# Patient Record
Sex: MALE | Race: OTHER | Employment: FULL TIME | URBAN - METROPOLITAN AREA
[De-identification: names, ages, dates, MRNs, and addresses within clinical notes are randomized per-mention and may not be internally consistent; named-entity substitution may affect disease eponyms.]

---

## 2024-05-16 ENCOUNTER — OFFICE VISIT (OUTPATIENT)
Facility: CLINIC | Age: 59
End: 2024-05-16

## 2024-05-16 VITALS
OXYGEN SATURATION: 98 % | SYSTOLIC BLOOD PRESSURE: 120 MMHG | HEART RATE: 60 BPM | HEIGHT: 68 IN | DIASTOLIC BLOOD PRESSURE: 80 MMHG | WEIGHT: 208.5 LBS | BODY MASS INDEX: 31.6 KG/M2

## 2024-05-16 DIAGNOSIS — Z12.12 ENCOUNTER FOR COLORECTAL CANCER SCREENING: ICD-10-CM

## 2024-05-16 DIAGNOSIS — Z23 NEED FOR VACCINATION: ICD-10-CM

## 2024-05-16 DIAGNOSIS — E78.2 MIXED HYPERLIPIDEMIA: ICD-10-CM

## 2024-05-16 DIAGNOSIS — Z12.11 ENCOUNTER FOR COLORECTAL CANCER SCREENING: ICD-10-CM

## 2024-05-16 DIAGNOSIS — Z00.01 ENCOUNTER FOR GENERAL ADULT MEDICAL EXAMINATION WITH ABNORMAL FINDINGS: Primary | ICD-10-CM

## 2024-05-16 DIAGNOSIS — E11.9 TYPE 2 DIABETES MELLITUS WITHOUT COMPLICATION, WITHOUT LONG-TERM CURRENT USE OF INSULIN (HCC): ICD-10-CM

## 2024-05-16 DIAGNOSIS — I15.2 HYPERTENSION ASSOCIATED WITH DIABETES (HCC): ICD-10-CM

## 2024-05-16 DIAGNOSIS — E11.59 HYPERTENSION ASSOCIATED WITH DIABETES (HCC): ICD-10-CM

## 2024-05-16 DIAGNOSIS — I25.10 CORONARY ARTERIOSCLEROSIS: ICD-10-CM

## 2024-05-16 PROBLEM — E78.5 HYPERLIPIDEMIA: Status: ACTIVE | Noted: 2024-02-18

## 2024-05-16 PROCEDURE — 90472 IMMUNIZATION ADMIN EACH ADD: CPT | Performed by: FAMILY MEDICINE

## 2024-05-16 PROCEDURE — 99386 PREV VISIT NEW AGE 40-64: CPT | Performed by: FAMILY MEDICINE

## 2024-05-16 PROCEDURE — 90715 TDAP VACCINE 7 YRS/> IM: CPT | Performed by: FAMILY MEDICINE

## 2024-05-16 PROCEDURE — 90677 PCV20 VACCINE IM: CPT | Performed by: FAMILY MEDICINE

## 2024-05-16 PROCEDURE — 90471 IMMUNIZATION ADMIN: CPT | Performed by: FAMILY MEDICINE

## 2024-05-16 RX ORDER — LISINOPRIL 5 MG/1
1 TABLET ORAL DAILY
COMMUNITY
End: 2024-05-16

## 2024-05-16 RX ORDER — ATORVASTATIN CALCIUM 80 MG/1
1 TABLET, FILM COATED ORAL DAILY
COMMUNITY
End: 2024-05-16

## 2024-05-16 RX ORDER — LISINOPRIL 5 MG/1
5 TABLET ORAL DAILY
Qty: 90 TABLET | Refills: 3 | Status: SHIPPED | OUTPATIENT
Start: 2024-05-16

## 2024-05-16 RX ORDER — SITAGLIPTIN 100 MG/1
1 TABLET, FILM COATED ORAL DAILY
COMMUNITY
End: 2024-05-16

## 2024-05-16 RX ORDER — ATORVASTATIN CALCIUM 80 MG/1
80 TABLET, FILM COATED ORAL DAILY
Qty: 90 TABLET | Refills: 3 | Status: SHIPPED | OUTPATIENT
Start: 2024-05-16

## 2024-05-16 RX ORDER — SITAGLIPTIN 100 MG/1
100 TABLET, FILM COATED ORAL DAILY
Qty: 90 TABLET | Refills: 3 | Status: SHIPPED | OUTPATIENT
Start: 2024-05-16

## 2024-05-16 NOTE — PROGRESS NOTES
Subjective:   Cesar STILL is a 58 year old male who presents for Establish Care (Patient comes to the office to establish care. ) and Physical (Patient is requesting annul physical exam. )     HTN  Doing well on lisinopril 5 mg daily    DM  Doing well on januvia 100 mg. Patient could not tolerate metformin due to skin reaction. Noted itching. Patient has had eye exam 8 months ago. Was told everything normal. No issues with floaters or cataracts.     HLD  Doing well on atorvastatin    CAD  Noted per chart review. Questionable NSTEMI in 2020 at [facility] Had heart cath and was told had several blockages. Has seen cardiology for this. Will follow up in Sept 2024. Had Nuclera stress test and echo. Doing well on ASA.     Health maintenance  Colonoscopy done in 2021. Due for repeat in 2026. Done at [facility]    History/Other:    Chief Complaint Reviewed and Verified  Nursing Notes Reviewed and   Verified  Tobacco Reviewed  Allergies Reviewed  Medications Reviewed    Problem List Reviewed  Medical History Reviewed  Surgical History   Reviewed  Family History Reviewed  Social History Reviewed         Tobacco:  He smoked tobacco in the past but quit greater than 12 months ago.  Social History     Tobacco Use   Smoking Status Former    Current packs/day: 0.50    Average packs/day: 0.5 packs/day for 20.4 years (10.2 ttl pk-yrs)    Types: Cigarettes    Start date: 2004   Smokeless Tobacco Never        Current Outpatient Medications   Medication Sig Dispense Refill    lisinopril 5 MG Oral Tab Take 1 tablet (5 mg total) by mouth daily. 90 tablet 3    Aspirin 81 MG Oral Cap Take 1 capsule by mouth daily. 90 capsule 3    Metoprolol Succinate 50 MG Oral Capsule ER 24 Hour Sprinkle Take 1 capsule by mouth daily. 90 capsule 3    JANUVIA 100 MG Oral Tab Take 1 tablet (100 mg total) by mouth daily. 90 tablet 3    atorvastatin 80 MG Oral Tab Take 1 tablet (80 mg total) by mouth daily. 90 tablet 3         Review of  Systems:  Review of Systems   Constitutional: Negative.    HENT: Negative.     Eyes: Negative.    Respiratory: Negative.     Cardiovascular: Negative.    Gastrointestinal: Negative.    Genitourinary: Negative.    Musculoskeletal: Negative.    Skin: Negative.    Neurological: Negative.    Psychiatric/Behavioral: Negative.           Objective:   /80 (BP Location: Left arm, Patient Position: Sitting, Cuff Size: adult)   Pulse 60   Ht 5' 8\" (1.727 m)   Wt 208 lb 8 oz (94.6 kg)   SpO2 98%   BMI 31.70 kg/m²  Estimated body mass index is 31.7 kg/m² as calculated from the following:    Height as of this encounter: 5' 8\" (1.727 m).    Weight as of this encounter: 208 lb 8 oz (94.6 kg).  Physical Exam  Vitals and nursing note reviewed.   Constitutional:       General: He is not in acute distress.     Appearance: Normal appearance. He is not ill-appearing.   HENT:      Head: Normocephalic and atraumatic.      Right Ear: Tympanic membrane normal. There is no impacted cerumen.      Left Ear: Tympanic membrane normal. There is no impacted cerumen.      Mouth/Throat:      Mouth: Mucous membranes are moist.      Pharynx: Oropharynx is clear. No oropharyngeal exudate or posterior oropharyngeal erythema.   Eyes:      General:         Right eye: No discharge.         Left eye: No discharge.      Extraocular Movements: Extraocular movements intact.      Pupils: Pupils are equal, round, and reactive to light.   Cardiovascular:      Rate and Rhythm: Normal rate and regular rhythm.      Heart sounds: Normal heart sounds. No murmur heard.     No friction rub. No gallop.   Pulmonary:      Effort: Pulmonary effort is normal.      Breath sounds: Normal breath sounds. No wheezing, rhonchi or rales.   Abdominal:      General: Abdomen is flat. Bowel sounds are normal. There is no distension.      Palpations: Abdomen is soft. There is no mass.      Tenderness: There is no abdominal tenderness. There is no guarding or rebound.    Musculoskeletal:         General: Normal range of motion.      Cervical back: Normal range of motion.      Right lower leg: No edema.      Left lower leg: No edema.   Skin:     General: Skin is warm and dry.      Findings: No rash.   Neurological:      General: No focal deficit present.      Mental Status: He is alert. Mental status is at baseline.   Psychiatric:         Mood and Affect: Mood normal.         Behavior: Behavior normal.       Bilateral barefoot skin diabetic exam is normal, visualized feet and the appearance is normal.  Bilateral monofilament/sensation of both feet is normal.  Pulsation pedal pulse exam of both lower legs/feet is normal as well.     Assessment & Plan:   1. Encounter for general adult medical examination with abnormal findings (Primary)  -     Lipid Panel; Future; Expected date: 05/16/2024  -     TSH W Reflex To Free T4; Future; Expected date: 05/16/2024  -     PSA Total, Screen; Future; Expected date: 05/16/2024  -     Comp Metabolic Panel (14); Future; Expected date: 05/16/2024    Ordered annual labs    2. Encounter for colorectal cancer screening  Has already had. Had patient sign IVANNA    3. Type 2 diabetes mellitus without complication, without long-term current use of insulin (HCC)  -     Hemoglobin A1C; Future; Expected date: 05/16/2024  -     Diabetic Retinopathy Exam; Future; Expected date: 05/16/2024  -     PCV20 (Prevnar 20)  -     Comp Metabolic Panel (14); Future; Expected date: 05/16/2024  -     Januvia; Take 1 tablet (100 mg total) by mouth daily.  Dispense: 90 tablet; Refill: 3    -as per last A1c well controlled. Ordered updated A1c and diabetes labs. No current change to medications  -ordered DM eye exam at other  office. Explained to patient how to get done.     4. Hypertension associated with diabetes (HCC)  -     Microalb/Creat Ratio, Random Urine; Future; Expected date: 05/16/2024  -     Lisinopril; Take 1 tablet (5 mg total) by mouth daily.  Dispense: 90  tablet; Refill: 3  -     Metoprolol Succinate; Take 1 capsule by mouth daily.  Dispense: 90 capsule; Refill: 3    Well controlled. Less than goal of less than 140/90. No dose changes to medications. Refilled    5. Coronary arteriosclerosis  -     Aspirin; Take 1 capsule by mouth daily.  Dispense: 90 capsule; Refill: 3    -following with cardiology. On ASA and statin. Refilled    6. Mixed hyperlipidemia  -     Metoprolol Succinate; Take 1 capsule by mouth daily.  Dispense: 90 capsule; Refill: 3  -     Atorvastatin Calcium; Take 1 tablet (80 mg total) by mouth daily.  Dispense: 90 tablet; Refill: 3    -ordered updated lipid levels. On statin    7. Need for vaccination  -     TETANUS, DIPHTHERIA TOXOIDS AND ACELLULAR PERTUSIS VACCINE (TDAP), >7 YEARS, IM USE    -adminstered by staff      Return in about 3 months (around 8/16/2024) for DM and HTN.    Lalitha Osuna MD, 5/16/2024, 4:06 PM

## 2024-05-17 ENCOUNTER — TELEPHONE (OUTPATIENT)
Facility: CLINIC | Age: 59
End: 2024-05-17

## 2024-05-17 DIAGNOSIS — I15.2 HYPERTENSION ASSOCIATED WITH DIABETES (HCC): Primary | ICD-10-CM

## 2024-05-17 DIAGNOSIS — E11.59 HYPERTENSION ASSOCIATED WITH DIABETES (HCC): Primary | ICD-10-CM

## 2024-05-17 RX ORDER — METOPROLOL SUCCINATE 50 MG/1
50 TABLET, EXTENDED RELEASE ORAL DAILY
Qty: 90 TABLET | Refills: 3 | Status: SHIPPED | OUTPATIENT
Start: 2024-05-17

## 2024-05-17 NOTE — TELEPHONE ENCOUNTER
Disp Refills Start End    metoprolol succinate ER 50 MG Oral Tablet 24 Hr 90 tablet 3 5/17/2024 --    Sig - Route: Take 1 tablet (50 mg total) by mouth daily. - Oral    Sent to pharmacy as: Metoprolol Succinate ER 50 MG Oral Tablet Extended Release 24 Hour (Toprol XL)    E-Prescribing Status: Receipt confirmed by pharmacy (5/17/2024 10:41 AM CDT)      Associated Diagnoses    Hypertension associated with diabetes (HCC)  - Primary        Pharmacy    [id number] - [place], [place] - [facility] [address] AT [facility], [phone], [phone]

## 2024-05-25 ENCOUNTER — LAB ENCOUNTER (OUTPATIENT)
Dept: LAB | Facility: HOSPITAL | Age: 59
End: 2024-05-25
Attending: FAMILY MEDICINE

## 2024-05-25 DIAGNOSIS — E11.9 TYPE 2 DIABETES MELLITUS WITHOUT COMPLICATION, WITHOUT LONG-TERM CURRENT USE OF INSULIN (HCC): ICD-10-CM

## 2024-05-25 DIAGNOSIS — I15.2 HYPERTENSION ASSOCIATED WITH DIABETES (HCC): ICD-10-CM

## 2024-05-25 DIAGNOSIS — E11.59 HYPERTENSION ASSOCIATED WITH DIABETES (HCC): ICD-10-CM

## 2024-05-25 DIAGNOSIS — Z00.01 ENCOUNTER FOR GENERAL ADULT MEDICAL EXAMINATION WITH ABNORMAL FINDINGS: ICD-10-CM

## 2024-05-25 LAB
ALBUMIN SERPL-MCNC: 4.2 G/DL (ref 3.2–4.8)
ALBUMIN/GLOB SERPL: 1.1 {RATIO} (ref 1–2)
ALP LIVER SERPL-CCNC: 104 U/L
ALT SERPL-CCNC: 34 U/L
ANION GAP SERPL CALC-SCNC: 7 MMOL/L (ref 0–18)
AST SERPL-CCNC: 27 U/L (ref ?–34)
BILIRUB SERPL-MCNC: 0.3 MG/DL (ref 0.3–1.2)
BUN BLD-MCNC: 14 MG/DL (ref 9–23)
BUN/CREAT SERPL: 16.3 (ref 10–20)
CALCIUM BLD-MCNC: 9 MG/DL (ref 8.7–10.4)
CHLORIDE SERPL-SCNC: 108 MMOL/L (ref 98–112)
CHOLEST SERPL-MCNC: 115 MG/DL (ref ?–200)
CO2 SERPL-SCNC: 27 MMOL/L (ref 21–32)
COMPLEXED PSA SERPL-MCNC: 0.33 NG/ML (ref ?–4)
CREAT BLD-MCNC: 0.86 MG/DL
CREAT UR-SCNC: 111 MG/DL
EGFRCR SERPLBLD CKD-EPI 2021: 101 ML/MIN/1.73M2 (ref 60–?)
EST. AVERAGE GLUCOSE BLD GHB EST-MCNC: 140 MG/DL (ref 68–126)
FASTING PATIENT LIPID ANSWER: YES
FASTING STATUS PATIENT QL REPORTED: YES
GLOBULIN PLAS-MCNC: 3.7 G/DL (ref 2–3.5)
GLUCOSE BLD-MCNC: 111 MG/DL (ref 70–99)
HBA1C MFR BLD: 6.5 % (ref ?–5.7)
HDLC SERPL-MCNC: 32 MG/DL (ref 40–59)
LDLC SERPL CALC-MCNC: 64 MG/DL (ref ?–100)
MICROALBUMIN UR-MCNC: 0.5 MG/DL
MICROALBUMIN/CREAT 24H UR-RTO: 4.5 UG/MG (ref ?–30)
NONHDLC SERPL-MCNC: 83 MG/DL (ref ?–130)
OSMOLALITY SERPL CALC.SUM OF ELEC: 295 MOSM/KG (ref 275–295)
POTASSIUM SERPL-SCNC: 4.4 MMOL/L (ref 3.5–5.1)
PROT SERPL-MCNC: 7.9 G/DL (ref 5.7–8.2)
SODIUM SERPL-SCNC: 142 MMOL/L (ref 136–145)
TRIGL SERPL-MCNC: 103 MG/DL (ref 30–149)
TSI SER-ACNC: 2.3 MIU/ML (ref 0.55–4.78)
VLDLC SERPL CALC-MCNC: 15 MG/DL (ref 0–30)

## 2024-05-25 PROCEDURE — 80053 COMPREHEN METABOLIC PANEL: CPT

## 2024-05-25 PROCEDURE — 83036 HEMOGLOBIN GLYCOSYLATED A1C: CPT

## 2024-05-25 PROCEDURE — 82043 UR ALBUMIN QUANTITATIVE: CPT

## 2024-05-25 PROCEDURE — 80061 LIPID PANEL: CPT

## 2024-05-25 PROCEDURE — 82570 ASSAY OF URINE CREATININE: CPT

## 2024-05-25 PROCEDURE — 36415 COLL VENOUS BLD VENIPUNCTURE: CPT

## 2024-05-25 PROCEDURE — 84443 ASSAY THYROID STIM HORMONE: CPT

## 2024-05-28 NOTE — PROGRESS NOTES
Attempted to call patient. Family member states patient is not home and gets home at 6:30 pm. Informed family member I will call back on Thursday our late night to give patient normal test results.

## 2024-05-28 NOTE — PROGRESS NOTES
Attempted to reach patient about lab results. Family member states patient is not home and he gets home at 6:30. Informed family member I will call back on Thursday our late night to give normal test results.

## 2024-06-13 ENCOUNTER — TELEPHONE (OUTPATIENT)
Facility: CLINIC | Age: 59
End: 2024-06-13

## 2024-06-13 DIAGNOSIS — I25.10 CORONARY ARTERIOSCLEROSIS: Primary | ICD-10-CM

## 2024-06-13 RX ORDER — ASPIRIN 81 MG/1
81 TABLET ORAL DAILY
Qty: 90 TABLET | Refills: 3 | Status: SHIPPED | OUTPATIENT
Start: 2024-06-13

## 2024-06-13 NOTE — TELEPHONE ENCOUNTER
Noted signed off on aspirin tablets. Thank you for pending    Lalitha Osuna MD, 06/13/24, 9:48 AM

## 2024-06-13 NOTE — TELEPHONE ENCOUNTER
With assist of NICK  Patients wife called back, stated patient stated he do not want to take Aspirin capsule prefers tablets , did  medication       Nurse please call back after Dr Baumann

## 2024-06-13 NOTE — TELEPHONE ENCOUNTER
Response to cancel request received from pharmacy.  Received: Today  [facility]  [facility] Refills  The pharmacy received the electronic cancel request, but could not cancel the prescription. Additional follow up tasks may be necessary based on the pharmacy response noted below.    Pharmacy Note: Prescription not found. Contact Pharmacy by other means          Pharmacy    [facility]/pharmacy #[id number] - [place], [place] - [address] AT [facility] [address], [phone], [phone] [address] [facility] [address]   Phone: [phone] Fax: [phone]   Hours: Not open 24 hours     Outpatient Medication Detail     Disp Refills Start End    Aspirin 81 MG Oral Cap (Discontinued) 90 capsule 3 5/16/2024 6/13/2024    Sig - Route: Take 1 capsule by mouth daily. - Oral    Sent to pharmacy as: Aspirin 81 MG Oral Capsule    Reason for Discontinue:  Stop This Medication    E-Prescribing Status: Receipt confirmed by pharmacy (5/16/2024  4:40 PM CDT)    E-Cancel Status: Request denied by pharmacy (6/13/2024  9:47 AM CDT)       E-Cancel Status Note: Prescription not found. Contact Pharmacy by other means      Order Providers    Prescribing Provider Encounter Provider   Lalitha Osuna MD Pena, Sandra, MD       Associated Diagnoses    Coronary arteriosclerosis        Encounter    View Encounter              This Order Has Been Discontinued    Order Status Reason By On   Discontinued  Stop This Medication Lalitha Osuna MD 6/13/24 0967

## 2024-06-14 ENCOUNTER — TELEPHONE (OUTPATIENT)
Facility: CLINIC | Age: 59
End: 2024-06-14

## 2024-06-14 DIAGNOSIS — Z12.11 ENCOUNTER FOR COLORECTAL CANCER SCREENING: Primary | ICD-10-CM

## 2024-06-14 DIAGNOSIS — Z12.12 ENCOUNTER FOR COLORECTAL CANCER SCREENING: Primary | ICD-10-CM

## 2024-06-14 NOTE — TELEPHONE ENCOUNTER
Mailed out colonoscopy order. I tried to leave a message for patient but \"voice mail box has not been set up yet.\"

## 2024-06-14 NOTE — TELEPHONE ENCOUNTER
Patient's wife calling in regards to aspirin stating that she missed our call and was calling back  Informed Chayito that it has been changed to tablets as requested and sent to the pharmacy yesterday  Chayito verbalized understanding and had no further questions or concerns at this time.

## 2024-06-24 ENCOUNTER — TELEPHONE (OUTPATIENT)
Facility: CLINIC | Age: 59
End: 2024-06-24

## 2024-08-22 ENCOUNTER — OFFICE VISIT (OUTPATIENT)
Facility: CLINIC | Age: 59
End: 2024-08-22
Payer: COMMERCIAL

## 2024-08-22 VITALS
HEART RATE: 61 BPM | WEIGHT: 214.38 LBS | DIASTOLIC BLOOD PRESSURE: 62 MMHG | BODY MASS INDEX: 33 KG/M2 | SYSTOLIC BLOOD PRESSURE: 114 MMHG | OXYGEN SATURATION: 96 %

## 2024-08-22 DIAGNOSIS — I25.10 CORONARY ARTERIOSCLEROSIS: ICD-10-CM

## 2024-08-22 DIAGNOSIS — E78.2 MIXED HYPERLIPIDEMIA: ICD-10-CM

## 2024-08-22 DIAGNOSIS — Z71.85 VACCINE COUNSELING: ICD-10-CM

## 2024-08-22 DIAGNOSIS — Z12.11 COLON CANCER SCREENING: ICD-10-CM

## 2024-08-22 DIAGNOSIS — I15.2 HYPERTENSION ASSOCIATED WITH DIABETES (HCC): ICD-10-CM

## 2024-08-22 DIAGNOSIS — E11.59 HYPERTENSION ASSOCIATED WITH DIABETES (HCC): ICD-10-CM

## 2024-08-22 DIAGNOSIS — E11.9 TYPE 2 DIABETES MELLITUS WITHOUT COMPLICATION, WITHOUT LONG-TERM CURRENT USE OF INSULIN (HCC): Primary | ICD-10-CM

## 2024-08-22 PROCEDURE — 99214 OFFICE O/P EST MOD 30 MIN: CPT | Performed by: FAMILY MEDICINE

## 2024-08-22 RX ORDER — SITAGLIPTIN 100 MG/1
100 TABLET, FILM COATED ORAL DAILY
Qty: 90 TABLET | Refills: 3 | Status: SHIPPED | OUTPATIENT
Start: 2024-08-22

## 2024-08-22 RX ORDER — ATORVASTATIN CALCIUM 80 MG/1
80 TABLET, FILM COATED ORAL DAILY
Qty: 90 TABLET | Refills: 3 | Status: SHIPPED | OUTPATIENT
Start: 2024-08-22

## 2024-08-22 RX ORDER — METOPROLOL SUCCINATE 50 MG/1
50 TABLET, EXTENDED RELEASE ORAL DAILY
Qty: 90 TABLET | Refills: 3 | Status: SHIPPED | OUTPATIENT
Start: 2024-08-22

## 2024-08-22 RX ORDER — ASPIRIN 81 MG/1
81 TABLET ORAL DAILY
Qty: 90 TABLET | Refills: 3 | Status: SHIPPED | OUTPATIENT
Start: 2024-08-22

## 2024-08-22 RX ORDER — LISINOPRIL 5 MG/1
5 TABLET ORAL DAILY
Qty: 90 TABLET | Refills: 3 | Status: SHIPPED | OUTPATIENT
Start: 2024-08-22

## 2024-08-22 NOTE — PROGRESS NOTES
Subjective:   Cesar STILL is a 59 year old male who presents for Diabetes and Hypertension     Diabetes  DM well controlled 6.5. due for A1c recheck in 3 days. Could not tolerate metformin in the past. Doing well on Januvia 100 mg. Has not yet had time to do eye exam due to 3 deaths in the family and had to travel to [place] each time.       Received colonoscopy records. Done 2018. Repeat 5 years. Overdue. Patient would like to do the cologuard    HTN  Doing well on lisinopril 5 mg daily and metoprolol 50 mg daily. Needs refills    Health maintenance  Will be getting shingrix at [facility].     HLD  Patient doing well on statin needs refills    CAD  Doing well. Needs refills on ASA    History/Other:    Chief Complaint Reviewed and Verified  No Further Nursing Notes to   Review  Tobacco Reviewed  Allergies Reviewed  Medications Reviewed    Problem List Reviewed  Medical History Reviewed  Surgical History   Reviewed  Family History Reviewed  Social History Reviewed         Tobacco:  He smoked tobacco in the past but quit greater than 12 months ago.  Social History     Tobacco Use   Smoking Status Former    Current packs/day: 0.50    Average packs/day: 0.5 packs/day for 20.6 years (10.3 ttl pk-yrs)    Types: Cigarettes    Start date: 2004   Smokeless Tobacco Never        Current Outpatient Medications   Medication Sig Dispense Refill    metoprolol succinate ER 50 MG Oral Tablet 24 Hr Take 1 tablet (50 mg total) by mouth daily. 90 tablet 3    lisinopril 5 MG Oral Tab Take 1 tablet (5 mg total) by mouth daily. 90 tablet 3    JANUVIA 100 MG Oral Tab Take 1 tablet (100 mg total) by mouth daily. 90 tablet 3    atorvastatin 80 MG Oral Tab Take 1 tablet (80 mg total) by mouth daily. 90 tablet 3    aspirin (ASPIRIN 81) 81 MG Oral Tab EC Take 1 tablet (81 mg total) by mouth daily. 90 tablet 3         Review of Systems:  Review of Systems   Constitutional: Negative.    HENT: Negative.     Eyes: Negative.     Respiratory: Negative.     Cardiovascular: Negative.    Gastrointestinal: Negative.    Genitourinary: Negative.    Musculoskeletal: Negative.    Skin: Negative.    Neurological: Negative.    Psychiatric/Behavioral: Negative.           Objective:   /62 (BP Location: Left arm, Patient Position: Sitting, Cuff Size: adult)   Pulse 61   Wt 214 lb 6 oz (97.2 kg)   SpO2 96%   BMI 32.60 kg/m²  Estimated body mass index is 32.6 kg/m² as calculated from the following:    Height as of 5/16/24: 5' 8\" (1.727 m).    Weight as of this encounter: 214 lb 6 oz (97.2 kg).  Physical Exam  Vitals and nursing note reviewed.   Constitutional:       General: He is not in acute distress.     Appearance: Normal appearance.   HENT:      Head: Normocephalic and atraumatic.   Eyes:      General:         Right eye: No discharge.         Left eye: No discharge.      Comments: Extraocular eye movements grossly intact   Cardiovascular:      Rate and Rhythm: Normal rate and regular rhythm.      Pulses: Normal pulses.      Heart sounds: Normal heart sounds. No murmur heard.     No friction rub. No gallop.   Pulmonary:      Effort: Pulmonary effort is normal. No respiratory distress.      Breath sounds: Normal breath sounds. No stridor. No wheezing, rhonchi or rales.   Musculoskeletal:      Comments: Normal gait   Skin:     Findings: No rash.   Neurological:      General: No focal deficit present.      Mental Status: He is alert. Mental status is at baseline.   Psychiatric:         Mood and Affect: Mood normal.         Behavior: Behavior normal.           Assessment & Plan:   1. Type 2 diabetes mellitus without complication, without long-term current use of insulin ([facility]) (Primary)  -     Hemoglobin A1C; Future; Expected date: 08/25/2024  -     Diabetic Retinopathy Exam; Future; Expected date: 08/22/2024  -     Januvia; Take 1 tablet (100 mg total) by mouth daily.  Dispense: 90 tablet; Refill: 3    -well controlled. 3 days too early for  A1c. Future dated. No dose changes to Januvia  -encouraged patient to get eye exam done    2. Hypertension associated with diabetes (HCC)  -     Metoprolol Succinate ER; Take 1 tablet (50 mg total) by mouth daily.  Dispense: 90 tablet; Refill: 3  -     Lisinopril; Take 1 tablet (5 mg total) by mouth daily.  Dispense: 90 tablet; Refill: 3    -well controlled and undergoal of less than 140/90. Refilled medications as above. No dose changes for now    3. Colon cancer screening  -     COLOGUARD COLON CANCER SCREENING (EXTERNAL)    -patient opts to do cologuard. Ordered    4. Mixed hyperlipidemia  -     Atorvastatin Calcium; Take 1 tablet (80 mg total) by mouth daily.  Dispense: 90 tablet; Refill: 3    -stable doing well refilled statin    5. Coronary arteriosclerosis  -     Aspirin; Take 1 tablet (81 mg total) by mouth daily.  Dispense: 90 tablet; Refill: 3    -stable. Doing well refilled ASA    6. Vaccine counseling  -patient will be getting [facility]rix at [facility]      Return in about 6 months (around 2/22/2025) for Diabetes and HTN.    Lalitha Osuna MD, 8/22/2024, 4:43 PM

## 2024-08-22 NOTE — PATIENT INSTRUCTIONS
For your diabetes eye camera. Please go to [address] [place] [address]. [address]. You do not need an appointment just tell them that you are there for your diabetes eye camera test. Hours are: 8:00 am to 4:00 p.m. They are off for lunch 12:00 p.m.-1:00 p.m.

## 2024-08-31 ENCOUNTER — LAB ENCOUNTER (OUTPATIENT)
Dept: LAB | Facility: HOSPITAL | Age: 59
End: 2024-08-31
Attending: FAMILY MEDICINE
Payer: COMMERCIAL

## 2024-08-31 DIAGNOSIS — E11.9 TYPE 2 DIABETES MELLITUS WITHOUT COMPLICATION, WITHOUT LONG-TERM CURRENT USE OF INSULIN (HCC): ICD-10-CM

## 2024-08-31 LAB
EST. AVERAGE GLUCOSE BLD GHB EST-MCNC: 140 MG/DL (ref 68–126)
HBA1C MFR BLD: 6.5 % (ref ?–5.7)

## 2024-08-31 PROCEDURE — 83036 HEMOGLOBIN GLYCOSYLATED A1C: CPT

## 2024-08-31 PROCEDURE — 36415 COLL VENOUS BLD VENIPUNCTURE: CPT

## 2024-10-07 ENCOUNTER — TELEPHONE (OUTPATIENT)
Facility: CLINIC | Age: 59
End: 2024-10-07

## 2024-10-07 NOTE — TELEPHONE ENCOUNTER
[See 8/22/24 Rx-->Receipt confirmed by pharmacy (8/22/2024  4:51 PM CDT) for quantity of #90 with 3 refills]    Chayito/wife [no signed verbal release seen in chart] called, I made her aware I cannot give her any information as no signed verbal release on file, but she can relay information - she was also made aware patient may complete the verbal release in office--> she verbalized understanding [patient at work now and not available to get a verbal consent for this call]. She states she called last week and told staff that patient is out of his Rx, it has been 1 week since patient has taken the Rx; he called pharmacy and they told him they do not see refills of Januvia. I made her aware I will call patient's pharmacy. She verbalized understanding. No further questions or concerns at this time.    I called [facility], spoke with Reginald/pharmacist; made aware of Rx refills should be on file; he states he can see the refills, but it does need a prior authorization [PA] now. I made him aware I will request PA to be initiated. He verbalized understanding. No further questions or concerns at this time.    Triage Support - please initiate PA for Rx

## 2024-10-08 NOTE — TELEPHONE ENCOUNTER
HUDSONI: [facility] PSR don't work with Dr Lalitha Osuna please reroute to the proper pool. Thank you.

## 2024-11-06 ENCOUNTER — TELEPHONE (OUTPATIENT)
Facility: CLINIC | Age: 59
End: 2024-11-06

## 2024-11-06 NOTE — TELEPHONE ENCOUNTER
Metoprolol ER 50m year supply sent to [facility] in [place] on 2024- called pharmacy and verified they will get prescription ready for patient.

## 2024-11-06 NOTE — TELEPHONE ENCOUNTER
Incoming call from patient's wife requesting a refill request for:    Medication Quantity Refills Start End   metoprolol succinate ER 50 MG Oral Tablet 24 Hr 90 tablet 3 8/22/2024 --   Sig:   Take 1 tablet (50 mg total) by mouth daily.     Route:   Oral       To be sent to:    [facility]/pharmacy #[id number] - [place], [place] - [address] AT [facility] [address], [phone], [phone] [address] [facility] [address]   Phone: [phone] Fax: [phone]   Hours: Not open 24 hours     Please assist.

## 2024-12-14 ENCOUNTER — TELEPHONE (OUTPATIENT)
Facility: CLINIC | Age: 59
End: 2024-12-14

## 2024-12-14 DIAGNOSIS — R19.5 POSITIVE COLORECTAL CANCER SCREENING USING COLOGUARD TEST: Primary | ICD-10-CM

## 2024-12-14 NOTE — TELEPHONE ENCOUNTER
Called patient regarding positive cologuard. Explained need to see GI to get colonoscopy. Provided patient GI number to call for appointment and instructed patient to call Monday for appointment. Patient verbalized understanding and agreement with the plan.    Lalitha Osuna MD, 12/14/24, 3:06 PM

## 2024-12-16 NOTE — TELEPHONE ENCOUNTER
Received a call from patient's wife to inform patient is schedule with the Dr.Kumar MACK  for 01/22/2024.

## 2025-01-03 ENCOUNTER — TELEPHONE (OUTPATIENT)
Facility: CLINIC | Age: 60
End: 2025-01-03

## 2025-01-07 NOTE — TELEPHONE ENCOUNTER
Denial letter received stating patient has not tried and failed Metformin.   Chart notes faxed back to [facility] for reconsideration showing patient unable to tolerate Metformin in the past.   Awaiting outcome.

## 2025-01-07 NOTE — TELEPHONE ENCOUNTER
Denied    Note from payer: PA Case: [id number], Status: Denied. Notification: Completed.  Payer: Juan C Case ID: [id number]  Electronic appeal: Not supported  View History    Awaiting official denial from insurance

## 2025-01-20 NOTE — H&P
[facility] - Gastroenterology                                                                                                               Reason for consult: eval    Requesting physician or provider: Lalitha Osuna MD    Chief Complaint   Patient presents with    Blood In Stool       HPI:   Cesar Bragg is a 59 year old year-old male with history of htn, hld, pre-dm:    he is here today for evaluation   After having pos cologuard 12/2024    he moves his bowels daily and without recent change. he denies straining and/or incomplete evacuation.  he denies brbpr and/or melena.    he denies acid reflux and/or heartburn. he denies dysphagia, odynophagia and/or globus. he denies abdominal pain. he denies nausea and/or vomiting.  he denies recent change in appetite and/or unintentional weight loss.    NSAIDS: no  Tobacco: quit 18-19 y ago  Alcohol: occasional  Marijuana: no  Illicit drugs: no    No FH GI malignancy    No history of adverse reaction to sedation  No MYRA  No anticoagulants  No pacemaker/defibrillator  No pain medications and/or sleep aides      Last colonoscopy 8/2018 Dr. Huston                                                                        Impression:       - One 3 mm polyp in the descending colon, removed with a cold biopsy        forceps. Resected and retrieved.        - One 2 mm polyp in the rectum, removed with a cold biopsy forceps.        Resected and retrieved.        - Internal hemorrhoids.        - Diverticulosis in the sigmoid colon.        - Tortuous colon.   Recommendation:       - Patient has a contact number available for emergencies. The signs and        symptoms of potential delayed complications were discussed with the        patient. Return to normal activities tomorrow. Written discharge        instructions were provided to the patient.        - Resume previous diet.        - Continue present medications.        - Await pathology  results.        - Repeat colonoscopy in 5 years for surveillance.        - Return to my office in 6 weeks.       Final Diagnosis:     A. COLON, DESCENDING POLYP, BIOPSY:     - COLONIC MUCOSA WITH A PROMINENT MUCOSAL LYMPHOID AGGREGATE.     B. COLON, RECTAL POLYP, BIOPSY:     - COLONIC MUCOSA WITH NO PATHOLOGIC CHANGE.                                                                                Last EGD: no    Wt Readings from Last 6 Encounters:   01/22/25 210 lb (95.3 kg)   08/22/24 214 lb 6 oz (97.2 kg)   05/16/24 208 lb 8 oz (94.6 kg)        History, Medications, Allergies, ROS:      Past Medical History:    Essential hypertension    Hyperlipidemia    Prediabetes      Past Surgical History:   Procedure Laterality Date    Appendectomy  2006      Family Hx:   Family History   Problem Relation Age of Onset    Heart Disorder Mother       Social History:   Social History     Socioeconomic History    Marital status:    Tobacco Use    Smoking status: Former     Current packs/day: 0.50     Average packs/day: 0.5 packs/day for 21.1 years (10.5 ttl pk-yrs)     Types: Cigarettes     Start date: 2004    Smokeless tobacco: Never   Vaping Use    Vaping status: Never Used   Substance and Sexual Activity    Alcohol use: Yes     Comment: social    Drug use: Never     Social Drivers of Health      Received from [facility]    Housing Stability        Medications (Active prior to today's visit):  Current Outpatient Medications   Medication Sig Dispense Refill    PEG 3350-KCl-Na Bicarb-NaCl (TRILYTE) 420 g Oral Recon Soln Take prep as directed by gastro office. May substitute with Trilyte/generic equivalent if needed. 4000 mL 0    metoprolol succinate ER 50 MG Oral Tablet 24 Hr Take 1 tablet (50 mg total) by mouth daily. 90 tablet 3    lisinopril 5 MG Oral Tab Take 1 tablet (5 mg total) by mouth daily. 90 tablet 3    JANUVIA 100 MG Oral Tab Take 1 tablet (100 mg total) by mouth daily. 90 tablet 3     atorvastatin 80 MG Oral Tab Take 1 tablet (80 mg total) by mouth daily. 90 tablet 3    aspirin (ASPIRIN 81) 81 MG Oral Tab EC Take 1 tablet (81 mg total) by mouth daily. 90 tablet 3       Allergies:  Allergies[1]    ROS:   CONSTITUTIONAL: negative for fevers, chills, sweats and weight loss  EYES Negative for red eyes, yellow eyes, changes in vision  HEENT: Negative for dysphagia and hoarseness  RESPIRATORY: Negative for cough and shortness of breath  CARDIOVASCULAR: Negative for chest pain, palpitations  GASTROINTESTINAL: See HPI  GENITOURINARY: Negative for dysuria and frequency  MUSCULOSKELETAL: Negative for arthralgias and myalgias  NEUROLOGICAL: Negative for dizziness and headaches  BEHAVIOR/PSYCH: Negative for anxiety and poor appetite    PHYSICAL EXAM:   Blood pressure 150/82, pulse 56, height 5' 8\" (1.727 m), weight 210 lb (95.3 kg).    GEN: WD/WN, NAD  HEENT: Supple symmetrical, trachea midline  CV: RRR, the extremities are warm and well perfused   LUNGS: No increased work of breathing  ABDOMEN: No scars, normal bowel sounds, soft, non-tender, non-distended no rebound or guarding, no masses, no hepatomegaly  MSK: No redness, no warmth, no swelling of joints  SKIN: No jaundice, no erythema, no rashes  HEMATOLOGIC: No bleeding, no bruising  NEURO: Alert and interactive, normal gait    Labs/Imaging/Procedures:     Patient's pertinent labs and imaging were reviewed and discussed with patient today.        .  ASSESSMENT/PLAN:   Cesar Bragg is a 59 year old year-old male with history of htn, hld, pre-dm:    #pos cologuard  Pos cologuard 12/2024.  Last c-scope 2018. 5 y crc screening interval advised. No red flags. No fhx gi malignancy. Plan for cln to exclude advanced polyp, etc.     1. Schedule colonoscopy with MAC w/ urgent pool MD [Diagnosis: pos cologuard]    2.  bowel prep from pharmacy (split trilyte)    3. Hold januvia 4 days prior to procedure  For cardiology patients and patients on blood  thinners:  Please contact your cardiology clinic for clearance to proceed with the endoscopic procedure. If you are on blood thinners, please also confirm with your cardiologic clinic that you are able to hold the blood thinner per our recommendations.\"    BLOOD THINNER ORDERS:  -Hold for 48 hours (Xarelto, Eliquis, Pradaxa, Savaysa)  -Hold for 3 days (Pletal)  -Hold for 5 days (Coumadin, Plavix, Brilinta, Aggrenox)  -Hold for 7 days (Effient)     For endocrinology insulin patients:    Please contact your endocrinology clinic for insulin adjustment orders prior to your endoscopic procedure.    4. Read all bowel prep instructions carefully. Bowel prep instructions can also be found online at:  [email or web address]/[email or web address]     5. AVOID seeds, nuts, popcorn, raw fruits and vegetables for 3 days before procedure    6.  If you start any NEW medication after your visit today, please notify us. Certain medications (like iron or weight loss medications) will need to be held before the procedure, or the procedure cannot be performed safely.      Orders This Visit:  No orders of the defined types were placed in this encounter.      Meds This Visit:  Requested Prescriptions     Signed Prescriptions Disp Refills    PEG 3350-KCl-Na Bicarb-NaCl (TRILYTE) 420 g Oral Recon Soln 4000 mL 0     Sig: Take prep as directed by gastro office. May substitute with Trilyte/generic equivalent if needed.       Imaging & Referrals:  None    ENDOSCOPIC RISK BENEFIT DISCUSSION: I described the procedure in great detail with the patient. I discussed the risks and benefits, including but not limited to: bleeding, perforation, infection, anesthesia complications, and even death. Patient will be NPO after midnight and will have a person physically present at time of pick-up to drive patient home. Patient verbalized understanding and agrees to proceed with procedure as planned.    Marlyn Mckenzie, APRN   1/20/2025        This note was partially  prepared using Dragon Medical voice recognition dictation software. As a result, errors may occur. When identified, these errors have been corrected. While every attempt is made to correct errors during dictation, discrepancies may still exist.          [1] No Known Allergies

## 2025-01-21 ENCOUNTER — TELEPHONE (OUTPATIENT)
Facility: CLINIC | Age: 60
End: 2025-01-21

## 2025-01-21 NOTE — TELEPHONE ENCOUNTER
Received fax that Januvia not covered.  However, I do see that Januvia was recently covered.  Can we clarify if this medication was covered or not?  Patient has tried and not tolerated metformin due to side effects in the past.    Lalitha Osuna MD, 01/21/25, 9:53 AM

## 2025-01-22 ENCOUNTER — TELEPHONE (OUTPATIENT)
Dept: GASTROENTEROLOGY | Facility: CLINIC | Age: 60
End: 2025-01-22

## 2025-01-22 ENCOUNTER — OFFICE VISIT (OUTPATIENT)
Dept: GASTROENTEROLOGY | Facility: CLINIC | Age: 60
End: 2025-01-22

## 2025-01-22 VITALS
HEART RATE: 56 BPM | WEIGHT: 210 LBS | DIASTOLIC BLOOD PRESSURE: 78 MMHG | BODY MASS INDEX: 31.83 KG/M2 | HEIGHT: 68 IN | SYSTOLIC BLOOD PRESSURE: 130 MMHG

## 2025-01-22 DIAGNOSIS — R19.5 POSITIVE COLORECTAL CANCER SCREENING USING COLOGUARD TEST: Primary | ICD-10-CM

## 2025-01-22 PROCEDURE — 3078F DIAST BP <80 MM HG: CPT | Performed by: NURSE PRACTITIONER

## 2025-01-22 PROCEDURE — 3075F SYST BP GE 130 - 139MM HG: CPT | Performed by: NURSE PRACTITIONER

## 2025-01-22 PROCEDURE — 99203 OFFICE O/P NEW LOW 30 MIN: CPT | Performed by: NURSE PRACTITIONER

## 2025-01-22 PROCEDURE — 3008F BODY MASS INDEX DOCD: CPT | Performed by: NURSE PRACTITIONER

## 2025-01-22 RX ORDER — POLYETHYLENE GLYCOL 3350, SODIUM CHLORIDE, SODIUM BICARBONATE, POTASSIUM CHLORIDE 420; 11.2; 5.72; 1.48 G/4L; G/4L; G/4L; G/4L
POWDER, FOR SOLUTION ORAL
Qty: 4000 ML | Refills: 0 | Status: SHIPPED | OUTPATIENT
Start: 2025-01-22

## 2025-01-22 NOTE — TELEPHONE ENCOUNTER
Appeal was approved.    Sylvia Montgomery CMA [place]    1/7/25  2:13 PM  Note      Dax approved 01/07/2025 - 01/07/2026. Pharmacy notified.

## 2025-01-22 NOTE — PATIENT INSTRUCTIONS
1. Schedule colonoscopy with MAC w/ urgent pool MD [Diagnosis: pos cologuard]    2.  bowel prep from pharmacy (split trilyte)    3. Hold januvia 4 days prior to procedure  For cardiology patients and patients on blood thinners:  Please contact your cardiology clinic for clearance to proceed with the endoscopic procedure. If you are on blood thinners, please also confirm with your cardiologic clinic that you are able to hold the blood thinner per our recommendations.\"    BLOOD THINNER ORDERS:  -Hold for 48 hours (Xarelto, Eliquis, Pradaxa, Savaysa)  -Hold for 3 days (Pletal)  -Hold for 5 days (Coumadin, Plavix, Brilinta, Aggrenox)  -Hold for 7 days (Effient)     For endocrinology insulin patients:    Please contact your endocrinology clinic for insulin adjustment orders prior to your endoscopic procedure.    4. Read all bowel prep instructions carefully. Bowel prep instructions can also be found online at:  www.[email or web address]/giprep     5. AVOID seeds, nuts, popcorn, raw fruits and vegetables for 3 days before procedure    6.  If you start any NEW medication after your visit today, please notify us. Certain medications (like iron or weight loss medications) will need to be held before the procedure, or the procedure cannot be performed safely.

## 2025-01-22 NOTE — TELEPHONE ENCOUNTER
Scheduled for: Colonoscopy [id number]   Provider Name: Dr. Jo    Date: Mon 2/03/2025   Location: [facility]   Sedation: MAC   Time: 8 am, (pt is aware that [facility] will call the day before to confirm arrival time)  Prep: split trilyte   Meds/Allergies Reconciled?: NKDA    Diagnosis with codes: + cologuard R19.5    Was patient informed to call insurance with codes (Y/N): Yes    Referral sent?: Yes, [employer] PPO    EM or [facility] notified?: I sent an electronic request to Endo Scheduling and received a confirmation today.     Medication Orders: Hold januvia 4 days prior to procedure  Patient is aware to NOT take iron pills, herbal meds and diet supplements for 7 days before exam. Also to NOT take any form of alcohol, recreational drugs and any forms of ED meds 24-72 hours before exam.      Misc Orders:       Further instructions given by staff: Instructions given in office and pt verbalized understanding           Instructions    1. Schedule colonoscopy with MAC w/ urgent pool MD [Diagnosis: pos cologuard]     2.  bowel prep from pharmacy (split trilyte)     3. Hold januvia 4 days prior to procedure  For cardiology patients and patients on blood thinners:  Please contact your cardiology clinic for clearance to proceed with the endoscopic procedure. If you are on blood thinners, please also confirm with your cardiologic clinic that you are able to hold the blood thinner per our recommendations.\"     BLOOD THINNER ORDERS:  -Hold for 48 hours (Xarelto, Eliquis, Pradaxa, Savaysa)  -Hold for 3 days (Pletal)  -Hold for 5 days (Coumadin, Plavix, Brilinta, Aggrenox)  -Hold for 7 days (Effient)      For endocrinology insulin patients:     Please contact your endocrinology clinic for insulin adjustment orders prior to your endoscopic procedure.     4. Read all bowel prep instructions carefully. Bowel prep instructions can also be found online at:  www.[email or web address]      5. AVOID seeds, nuts, popcorn, raw fruits and vegetables for 3  days before procedure     6.  If you start any NEW medication after your visit today, please notify us. Certain medications (like iron or weight loss medications) will need to be held before the procedure, or the procedure cannot be performed safely.

## 2025-02-03 PROBLEM — K64.8 INTERNAL HEMORRHOIDS: Status: ACTIVE | Noted: 2025-02-03

## 2025-02-20 ENCOUNTER — TELEPHONE (OUTPATIENT)
Facility: CLINIC | Age: 60
End: 2025-02-20

## 2025-02-20 NOTE — TELEPHONE ENCOUNTER
Health maintenance updated. Last colonoscopy done 2/3/25. 5 year recall placed into [facility], next due on 02/2030 per Dr. Jo.

## 2025-05-21 DIAGNOSIS — E11.9 TYPE 2 DIABETES MELLITUS WITHOUT COMPLICATION, WITHOUT LONG-TERM CURRENT USE OF INSULIN (HCC): ICD-10-CM

## 2025-05-21 DIAGNOSIS — I25.10 CORONARY ARTERIOSCLEROSIS: ICD-10-CM

## 2025-05-21 NOTE — TELEPHONE ENCOUNTER
Incoming call from patient's wife requesting a refill for 2 medications:     Medication Quantity Refills Start End   JANUVIA 100 MG Oral Tab 90 tablet 3 8/22/2024 --   Sig:   Take 1 tablet (100 mg total) by mouth daily.     Route:   Oral     TALAT:   Yes       Medication Quantity Refills Start End   aspirin (ASPIRIN 81) 81 MG Oral Tab EC 90 tablet 3 8/22/2024 --   Sig:   Take 1 tablet (81 mg total) by mouth daily.     Route:   Oral       To be sent to:    [facility]/pharmacy #[id number] - [place], [place] - [address] AT [facility] [address], [phone], [phone] [address] [facility] [address]   Phone: [phone] Fax: [phone]     Please assist

## 2025-05-22 RX ORDER — ASPIRIN 81 MG/1
81 TABLET ORAL DAILY
Qty: 90 TABLET | Refills: 3 | OUTPATIENT
Start: 2025-05-22

## 2025-05-22 RX ORDER — SITAGLIPTIN 100 MG/1
100 TABLET, FILM COATED ORAL DAILY
Qty: 90 TABLET | Refills: 3 | OUTPATIENT
Start: 2025-05-22

## 2025-06-24 ENCOUNTER — OFFICE VISIT (OUTPATIENT)
Facility: CLINIC | Age: 60
End: 2025-06-24
Payer: COMMERCIAL

## 2025-06-24 VITALS
SYSTOLIC BLOOD PRESSURE: 118 MMHG | DIASTOLIC BLOOD PRESSURE: 82 MMHG | HEIGHT: 68 IN | WEIGHT: 208 LBS | BODY MASS INDEX: 31.52 KG/M2 | HEART RATE: 78 BPM | OXYGEN SATURATION: 99 %

## 2025-06-24 DIAGNOSIS — I15.2 HYPERTENSION ASSOCIATED WITH DIABETES (HCC): ICD-10-CM

## 2025-06-24 DIAGNOSIS — E78.2 MIXED HYPERLIPIDEMIA: ICD-10-CM

## 2025-06-24 DIAGNOSIS — E11.9 TYPE 2 DIABETES MELLITUS WITHOUT COMPLICATION, WITHOUT LONG-TERM CURRENT USE OF INSULIN (HCC): ICD-10-CM

## 2025-06-24 DIAGNOSIS — E11.59 HYPERTENSION ASSOCIATED WITH DIABETES (HCC): ICD-10-CM

## 2025-06-24 DIAGNOSIS — Z00.01 ENCOUNTER FOR GENERAL ADULT MEDICAL EXAMINATION WITH ABNORMAL FINDINGS: Primary | ICD-10-CM

## 2025-06-24 PROCEDURE — 3074F SYST BP LT 130 MM HG: CPT | Performed by: FAMILY MEDICINE

## 2025-06-24 PROCEDURE — 99396 PREV VISIT EST AGE 40-64: CPT | Performed by: FAMILY MEDICINE

## 2025-06-24 PROCEDURE — 3008F BODY MASS INDEX DOCD: CPT | Performed by: FAMILY MEDICINE

## 2025-06-24 PROCEDURE — 3079F DIAST BP 80-89 MM HG: CPT | Performed by: FAMILY MEDICINE

## 2025-06-24 PROCEDURE — 99214 OFFICE O/P EST MOD 30 MIN: CPT | Performed by: FAMILY MEDICINE

## 2025-06-24 RX ORDER — BLOOD SUGAR DIAGNOSTIC
STRIP MISCELLANEOUS
Qty: 100 EACH | Refills: 3 | Status: SHIPPED | OUTPATIENT
Start: 2025-06-24

## 2025-06-24 RX ORDER — BLOOD-GLUCOSE METER
1 EACH MISCELLANEOUS DAILY
Qty: 1 KIT | Refills: 0 | Status: SHIPPED | OUTPATIENT
Start: 2025-06-24

## 2025-06-24 RX ORDER — LANCETS 28 GAUGE
EACH MISCELLANEOUS
Qty: 100 EACH | Refills: 3 | Status: SHIPPED | OUTPATIENT
Start: 2025-06-24

## 2025-06-24 NOTE — PROGRESS NOTES
Subjective:   Cesar Bragg is a 60 year old male who presents for Physical     Pateint presents for annual physical and follow up DM. Today is patient's birthday!    DM  Patient doing well on januvia. Was not able to tolerate metformin in the past.    Did do colonoscopy after positive cologuard. Colonoscopy normal. Due to repeat in 5 years.     HTN  Doing well on lisinopril    HLD  Doing well on statin    Patient did get shingrix at [facility].      History/Other:    Chief Complaint Reviewed and Verified  No Further Nursing Notes to   Review  Tobacco Reviewed  Allergies Reviewed  Medications Reviewed    Problem List Reviewed  Medical History Reviewed  Surgical History   Reviewed  Family History Reviewed  Social History Reviewed         Tobacco:  He smoked tobacco in the past but quit greater than 12 months ago.  Tobacco Use[1]     Current Medications[2]      Review of Systems:  Review of Systems   Constitutional: Negative.    HENT: Negative.     Eyes: Negative.    Respiratory: Negative.     Cardiovascular: Negative.    Gastrointestinal: Negative.    Genitourinary: Negative.    Musculoskeletal: Negative.    Skin: Negative.    Neurological: Negative.    Psychiatric/Behavioral: Negative.           Objective:   /82   Pulse 78   Ht 5' 8\" (1.727 m)   Wt 208 lb (94.3 kg)   SpO2 99%   BMI 31.63 kg/m²  Estimated body mass index is 31.63 kg/m² as calculated from the following:    Height as of this encounter: 5' 8\" (1.727 m).    Weight as of this encounter: 208 lb (94.3 kg).  Physical Exam  Vitals and nursing note reviewed.   Constitutional:       General: He is not in acute distress.     Appearance: Normal appearance. He is not ill-appearing.   HENT:      Head: Normocephalic and atraumatic.      Right Ear: Tympanic membrane normal. There is no impacted cerumen.      Left Ear: Tympanic membrane normal. There is no impacted cerumen.      Mouth/Throat:      Mouth: Mucous membranes are moist.      Pharynx:  Oropharynx is clear. No oropharyngeal exudate or posterior oropharyngeal erythema.   Eyes:      General:         Right eye: No discharge.         Left eye: No discharge.      Extraocular Movements: Extraocular movements intact.      Pupils: Pupils are equal, round, and reactive to light.   Cardiovascular:      Rate and Rhythm: Normal rate and regular rhythm.      Heart sounds: Normal heart sounds. No murmur heard.     No friction rub. No gallop.   Pulmonary:      Effort: Pulmonary effort is normal.      Breath sounds: Normal breath sounds. No wheezing, rhonchi or rales.   Abdominal:      General: Abdomen is flat. Bowel sounds are normal. There is no distension.      Palpations: Abdomen is soft. There is no mass.      Tenderness: There is no abdominal tenderness. There is no guarding or rebound.   Musculoskeletal:         General: Normal range of motion.      Cervical back: Normal range of motion.      Right lower leg: No edema.      Left lower leg: No edema.   Skin:     General: Skin is warm and dry.      Findings: No rash.   Neurological:      General: No focal deficit present.      Mental Status: He is alert. Mental status is at baseline.   Psychiatric:         Mood and Affect: Mood normal.         Behavior: Behavior normal.       Bilateral barefoot skin diabetic exam is normal, visualized feet and the appearance is normal.  Bilateral monofilament/sensation of both feet is normal.  Pulsation pedal pulse exam of both lower legs/feet is normal as well.        Assessment & Plan:   1. Encounter for general adult medical examination with abnormal findings (Primary)  -     Comp Metabolic Panel (14); Future; Expected date: 06/24/2025  -     Hemoglobin A1C; Future; Expected date: 06/24/2025  -     Lipid Panel; Future; Expected date: 06/24/2025  -     TSH W Reflex To Free T4; Future; Expected date: 06/24/2025  -     CBC With Differential With Platelet; Future; Expected date: 06/24/2025    Ordered annual labs. Lab closed by  time of patient visit. Patient will return for labs.   Colonoscopy up to date.   Vaccines up to date    2. Hypertension associated with diabetes (HCC)  -     Comp Metabolic Panel (14); Future; Expected date: 06/24/2025  -     Microalb/Creat Ratio, Random Urine; Future; Expected date: 06/24/2025    Well controlled on lisinopril and metoprolol. No dose change for now. Declines need for refill at this time.     3. Type 2 diabetes mellitus without complication, without long-term current use of insulin ([facility])  -     Diabetic Retinopathy Exam; Future; Expected date: 06/24/2025  -     Accu-Chek Guide Me; 1 each daily. Check glucose once daily E11.65  Dispense: 1 kit; Refill: 0  -     Accu-Chek Guide Test; Check glucose once daily E11.65  Dispense: 100 each; Refill: 3  -     FreeStyle Lancets; Check glucose once daily E11.65  Dispense: 100 each; Refill: 3    Well controlled. No dose changes to januvia.   Ordered DM eye exam    4. Mixed hyperlipidemia  Stable doing well. Ordered updated lipid panel as above      Return in about 1 year (around 6/24/2026), or if symptoms worsen or fail to improve.    Lalitha Osuna MD, 6/24/2025, 4:10 PM          [1]   Social History  Tobacco Use   Smoking Status Former    Current packs/day: 0.50    Average packs/day: 0.5 packs/day for 21.5 years (10.7 ttl pk-yrs)    Types: Cigarettes    Start date: 2004   Smokeless Tobacco Never   [2]   Current Outpatient Medications   Medication Sig Dispense Refill    Blood Glucose Monitoring Suppl (ACCU-CHEK GUIDE ME) w/Device Does not apply Kit 1 each daily. Check glucose once daily E11.65 1 kit 0    Glucose Blood (ACCU-CHEK GUIDE TEST) In Vitro Strip Check glucose once daily E11.65 100 each 3    FreeStyle Lancets Does not apply Misc Check glucose once daily E11.65 100 each 3    metoprolol succinate ER 50 MG Oral Tablet 24 Hr Take 1 tablet (50 mg total) by mouth daily. 90 tablet 3    lisinopril 5 MG Oral Tab Take 1 tablet (5 mg total) by mouth daily. 90  tablet 3    JANUVIA 100 MG Oral Tab Take 1 tablet (100 mg total) by mouth daily. 90 tablet 3    atorvastatin 80 MG Oral Tab Take 1 tablet (80 mg total) by mouth daily. 90 tablet 3    aspirin (ASPIRIN 81) 81 MG Oral Tab EC Take 1 tablet (81 mg total) by mouth daily. 90 tablet 3

## 2025-06-24 NOTE — PATIENT INSTRUCTIONS
The [facility], [place] lab hours are Monday through Friday 6:30am - 8pm and Saturday through Sunday 6:30am - 3pm Location: [address] [address] Phone: [phone]    For your diabetes eye camera. Please go to [address] [place] [address]. [address]. You do not need an appointment just tell them that you are there for your diabetes eye camera test. Hours are: 8:00 am to 4:00 p.m. They are off for lunch 12:00 p.m.-1:00 p.m.     [phone]

## 2025-07-12 ENCOUNTER — LAB ENCOUNTER (OUTPATIENT)
Dept: LAB | Facility: HOSPITAL | Age: 60
End: 2025-07-12
Attending: FAMILY MEDICINE
Payer: COMMERCIAL

## 2025-07-12 ENCOUNTER — RESULTS FOLLOW-UP (OUTPATIENT)
Facility: CLINIC | Age: 60
End: 2025-07-12

## 2025-07-12 DIAGNOSIS — E11.59 HYPERTENSION ASSOCIATED WITH DIABETES (HCC): ICD-10-CM

## 2025-07-12 DIAGNOSIS — Z00.01 ENCOUNTER FOR GENERAL ADULT MEDICAL EXAMINATION WITH ABNORMAL FINDINGS: ICD-10-CM

## 2025-07-12 DIAGNOSIS — E83.51 HYPOCALCEMIA: Primary | ICD-10-CM

## 2025-07-12 DIAGNOSIS — I15.2 HYPERTENSION ASSOCIATED WITH DIABETES (HCC): ICD-10-CM

## 2025-07-12 LAB
ALBUMIN SERPL-MCNC: 4.4 G/DL (ref 3.2–4.8)
ALBUMIN/GLOB SERPL: 1.7 {RATIO} (ref 1–2)
ALP LIVER SERPL-CCNC: 97 U/L (ref 45–117)
ALT SERPL-CCNC: 24 U/L (ref 10–49)
ANION GAP SERPL CALC-SCNC: 10 MMOL/L (ref 0–18)
AST SERPL-CCNC: 20 U/L (ref ?–34)
BASOPHILS # BLD AUTO: 0.05 X10(3) UL (ref 0–0.2)
BASOPHILS NFR BLD AUTO: 0.6 %
BILIRUB SERPL-MCNC: 0.6 MG/DL (ref 0.3–1.2)
BUN BLD-MCNC: 18 MG/DL (ref 9–23)
BUN/CREAT SERPL: 18.8 (ref 10–20)
CALCIUM BLD-MCNC: 8.6 MG/DL (ref 8.7–10.4)
CHLORIDE SERPL-SCNC: 105 MMOL/L (ref 98–112)
CHOLEST SERPL-MCNC: 182 MG/DL (ref ?–200)
CO2 SERPL-SCNC: 24 MMOL/L (ref 21–32)
CREAT BLD-MCNC: 0.96 MG/DL (ref 0.7–1.3)
CREAT UR-SCNC: 166.1 MG/DL
DEPRECATED RDW RBC AUTO: 46.5 FL (ref 35.1–46.3)
EGFRCR SERPLBLD CKD-EPI 2021: 91 ML/MIN/1.73M2 (ref 60–?)
EOSINOPHIL # BLD AUTO: 0.21 X10(3) UL (ref 0–0.7)
EOSINOPHIL NFR BLD AUTO: 2.7 %
ERYTHROCYTE [DISTWIDTH] IN BLOOD BY AUTOMATED COUNT: 14.4 % (ref 11–15)
EST. AVERAGE GLUCOSE BLD GHB EST-MCNC: 143 MG/DL (ref 68–126)
FASTING PATIENT LIPID ANSWER: YES
FASTING STATUS PATIENT QL REPORTED: YES
GLOBULIN PLAS-MCNC: 2.6 G/DL (ref 2–3.5)
GLUCOSE BLD-MCNC: 119 MG/DL (ref 70–99)
HBA1C MFR BLD: 6.6 % (ref ?–5.7)
HCT VFR BLD AUTO: 40.7 % (ref 39–53)
HDLC SERPL-MCNC: 51 MG/DL (ref 40–59)
HGB BLD-MCNC: 13.7 G/DL (ref 13–17.5)
IMM GRANULOCYTES # BLD AUTO: 0.03 X10(3) UL (ref 0–1)
IMM GRANULOCYTES NFR BLD: 0.4 %
LDLC SERPL CALC-MCNC: 103 MG/DL (ref ?–100)
LYMPHOCYTES # BLD AUTO: 2.09 X10(3) UL (ref 1–4)
LYMPHOCYTES NFR BLD AUTO: 26.9 %
MCH RBC QN AUTO: 29.5 PG (ref 26–34)
MCHC RBC AUTO-ENTMCNC: 33.7 G/DL (ref 31–37)
MCV RBC AUTO: 87.5 FL (ref 80–100)
MICROALBUMIN UR-MCNC: 1.4 MG/DL
MICROALBUMIN/CREAT 24H UR-RTO: 8.4 UG/MG (ref ?–30)
MONOCYTES # BLD AUTO: 0.71 X10(3) UL (ref 0.1–1)
MONOCYTES NFR BLD AUTO: 9.1 %
NEUTROPHILS # BLD AUTO: 4.68 X10 (3) UL (ref 1.5–7.7)
NEUTROPHILS # BLD AUTO: 4.68 X10(3) UL (ref 1.5–7.7)
NEUTROPHILS NFR BLD AUTO: 60.3 %
NONHDLC SERPL-MCNC: 131 MG/DL (ref ?–130)
OSMOLALITY SERPL CALC.SUM OF ELEC: 291 MOSM/KG (ref 275–295)
PLATELET # BLD AUTO: 252 10(3)UL (ref 150–450)
POTASSIUM SERPL-SCNC: 3.9 MMOL/L (ref 3.5–5.1)
PROT SERPL-MCNC: 7 G/DL (ref 5.7–8.2)
RBC # BLD AUTO: 4.65 X10(6)UL (ref 4.3–5.7)
SODIUM SERPL-SCNC: 139 MMOL/L (ref 136–145)
TRIGL SERPL-MCNC: 162 MG/DL (ref 30–149)
TSI SER-ACNC: 2.01 UIU/ML (ref 0.55–4.78)
VLDLC SERPL CALC-MCNC: 27 MG/DL (ref 0–30)
WBC # BLD AUTO: 7.8 X10(3) UL (ref 4–11)

## 2025-07-12 PROCEDURE — 80050 GENERAL HEALTH PANEL: CPT | Performed by: FAMILY MEDICINE

## 2025-07-12 PROCEDURE — 82570 ASSAY OF URINE CREATININE: CPT | Performed by: FAMILY MEDICINE

## 2025-07-12 PROCEDURE — 83036 HEMOGLOBIN GLYCOSYLATED A1C: CPT | Performed by: FAMILY MEDICINE

## 2025-07-12 PROCEDURE — 82043 UR ALBUMIN QUANTITATIVE: CPT | Performed by: FAMILY MEDICINE

## 2025-07-12 PROCEDURE — 80061 LIPID PANEL: CPT | Performed by: FAMILY MEDICINE

## 2025-07-12 NOTE — TELEPHONE ENCOUNTER
Called patient regarding results.  Discussed diabetes overall stable.  No dose changes to Januvia.  Counseled patient on elevation of LDL.  Patient states is taking his atorvastatin daily.  Counseled patient on diet and exercise.  Also informed patient of mild hypocalcemia.  Counseled patient to increase dietary sources of calcium and will repeat BMP in 1 month.  Patient verbalized understanding and agreement with the plan    Lalitha Osuna MD, 07/12/25, 12:23 PM

## 2025-07-14 DIAGNOSIS — E11.59 HYPERTENSION ASSOCIATED WITH DIABETES (HCC): Primary | ICD-10-CM

## 2025-07-14 DIAGNOSIS — I15.2 HYPERTENSION ASSOCIATED WITH DIABETES (HCC): Primary | ICD-10-CM

## 2025-07-14 NOTE — TELEPHONE ENCOUNTER
Wife Chayito calling, asking for BP machine to be sent to pharmacy [facility]   The pharmacist there told wife to have patient asked for PCP to fax an order for machine to them and they may be able to run with insurance.    Please advise.   Order will need to be faxed to [facility] at fax # [phone]     [employer]     ID [id number]

## 2025-08-16 ENCOUNTER — RESULTS FOLLOW-UP (OUTPATIENT)
Facility: CLINIC | Age: 60
End: 2025-08-16

## 2025-08-16 ENCOUNTER — LAB ENCOUNTER (OUTPATIENT)
Dept: LAB | Facility: HOSPITAL | Age: 60
End: 2025-08-16
Attending: FAMILY MEDICINE

## 2025-08-16 DIAGNOSIS — E83.51 HYPOCALCEMIA: ICD-10-CM

## 2025-08-16 LAB
ANION GAP SERPL CALC-SCNC: 7 MMOL/L (ref 0–18)
BUN BLD-MCNC: 21 MG/DL (ref 9–23)
BUN/CREAT SERPL: 19.6 (ref 10–20)
CALCIUM BLD-MCNC: 8.8 MG/DL (ref 8.7–10.4)
CHLORIDE SERPL-SCNC: 107 MMOL/L (ref 98–112)
CO2 SERPL-SCNC: 26 MMOL/L (ref 21–32)
CREAT BLD-MCNC: 1.07 MG/DL (ref 0.7–1.3)
EGFRCR SERPLBLD CKD-EPI 2021: 80 ML/MIN/1.73M2 (ref 60–?)
FASTING STATUS PATIENT QL REPORTED: YES
GLUCOSE BLD-MCNC: 109 MG/DL (ref 70–99)
OSMOLALITY SERPL CALC.SUM OF ELEC: 294 MOSM/KG (ref 275–295)
POTASSIUM SERPL-SCNC: 4.2 MMOL/L (ref 3.5–5.1)
SODIUM SERPL-SCNC: 140 MMOL/L (ref 136–145)

## 2025-08-16 PROCEDURE — 80048 BASIC METABOLIC PNL TOTAL CA: CPT | Performed by: FAMILY MEDICINE

## 2025-08-23 DIAGNOSIS — I25.10 CORONARY ARTERIOSCLEROSIS: ICD-10-CM

## 2025-08-27 RX ORDER — ASPIRIN 81 MG/1
81 TABLET ORAL DAILY
Qty: 90 TABLET | Refills: 3 | Status: SHIPPED | OUTPATIENT
Start: 2025-08-27